# Patient Record
Sex: MALE | Race: WHITE | NOT HISPANIC OR LATINO | URBAN - METROPOLITAN AREA
[De-identification: names, ages, dates, MRNs, and addresses within clinical notes are randomized per-mention and may not be internally consistent; named-entity substitution may affect disease eponyms.]

---

## 2017-11-03 ENCOUNTER — IMPORTED ENCOUNTER (OUTPATIENT)
Dept: URBAN - METROPOLITAN AREA CLINIC 43 | Facility: CLINIC | Age: 47
End: 2017-11-03

## 2017-11-03 PROBLEM — H40.013: Noted: 2017-11-03

## 2017-11-03 PROBLEM — H16.223: Noted: 2017-11-03

## 2017-11-03 PROBLEM — H25.13: Noted: 2017-11-03

## 2017-11-03 PROBLEM — H35.373: Noted: 2017-11-03

## 2017-12-01 ENCOUNTER — IMPORTED ENCOUNTER (OUTPATIENT)
Dept: URBAN - METROPOLITAN AREA CLINIC 43 | Facility: CLINIC | Age: 47
End: 2017-12-01

## 2017-12-01 PROBLEM — T44.3X5A: Noted: 2017-12-01

## 2018-10-17 NOTE — PATIENT DISCUSSION
**MILD DRY EYE, OU: PRESCRIBED ARTIFICIAL TEARS 2-3 X A DAY OU. RECOMMENDS OMEGA-3 FISH OIL WITH [facility]IMARY [facility]ARE PHYSICIANS APPROVAL. RETURN FOR FOLLOW-UP AS SCHEDULED OR SOONER IF SYMPTOMS WORSEN.

## 2018-10-17 NOTE — PATIENT DISCUSSION
Continue: PreserVision AREDS 2 (vit c,h-de-hmzvu-lutein-zeaxan): capsule: 028-168-68-8 mg-unit-mg-mg 1 capsule once a day by mouth

## 2018-10-17 NOTE — PATIENT DISCUSSION
POSTERIOR CAPSULAR FIBROSIS, OU:   VISUALLY SIGNIFICANT. SCHEDULE YAG CAPSULOTOMY OS FIRST THEN LATER YAG CAPSULOTOMY OD IF VISUAL SYMPTOMS PERSIST.

## 2018-12-31 NOTE — PATIENT DISCUSSION
Continue: PreserVision AREDS 2 (vit c,u-in-ddffl-lutein-zeaxan): capsule: 827-790-91-7 mg-unit-mg-mg 1 capsule once a day by mouth

## 2019-11-14 NOTE — PATIENT DISCUSSION
AMD (DRY), OU:  PRESCRIBE AREDS 2 VITAMINS AND RECOMMEND UV PROTECTION. PATIENT IS AWARE OF AMSLER GRID AND HOW TO CHECK FOR PROGRESSION. SMOKING AVOIDANCE ADVISED. REFER TO DR. Fernanda Viveros FOR EVALUATION.

## 2020-04-10 NOTE — PATIENT DISCUSSION
Continue: PreserVision AREDS 2 (vit c,h-bw-yczsq-lutein-zeaxan): capsule: [phone] mg-unit-mg-mg 1 capsule once a day by mouth

## 2020-04-19 ASSESSMENT — VISUAL ACUITY
OS_SC: J1+-1
OS_SC: 20/20
OS_SC: 20/15-2
OD_SC: 20/25-1
OS_CC: J1+
OS_SC: 20/20 -1
OD_CC: J1+
OD_SC: J1+-1
OD_SC: 20/25+2
OD_SC: 20/20

## 2020-04-19 ASSESSMENT — KERATOMETRY
OS_K2POWER_DIOPTERS: 42.75
OD_AXISANGLE2_DEGREES: 47
OS_K1POWER_DIOPTERS: 42.5
OD_AXISANGLE_DEGREES: 137
OD_K1POWER_DIOPTERS: 43
OS_AXISANGLE2_DEGREES: 101
OS_AXISANGLE_DEGREES: 11
OD_K2POWER_DIOPTERS: 43.5

## 2020-04-19 ASSESSMENT — TONOMETRY
OD_IOP_MMHG: 15.0
OS_IOP_MMHG: 11.0
OS_IOP_MMHG: 13.0
OD_IOP_MMHG: 12.0

## 2020-06-05 NOTE — PATIENT DISCUSSION
Continue: PreserVision AREDS 2 (vit c,c-vz-scxvn-lutein-zeaxan): capsule: 070-439-90-9 mg-unit-mg-mg 1 capsule once a day by mouth

## 2020-10-16 NOTE — PATIENT DISCUSSION
Continue: PreserVision AREDS 2 (vit c,z-ms-sltnc-lutein-zeaxan): capsule: 815-033-61-1 mg-unit-mg-mg 1 capsule once a day by mouth

## 2022-12-01 NOTE — PATIENT DISCUSSION
"----- Message from Pablito Eastman sent at 12/1/2022  9:57 AM CST -----  Regarding: Appt  Contact: URBANO RICO [[id number]]  ===View-only below this line===      ----- Message -----       From:Urbano Rico "Juan F"       Sent:11/30/2022  4:59 PM CST         To:Bienvenido Aldana MD    Subject:Appointment Request    Appointment Request From: Urbano Rico    With Provider: Bienvenido Aldana MD [[facility]]    Preferred Date Range: Any    Preferred Times: Monday Afternoon, Tuesday Afternoon, Wednesday Afternoon, Thursday Afternoon, Friday Afternoon    Reason for visit: I might have Pink Eye    Comments:  I think I might have pink eye.     " STABLE NON-EXUDATIVE AMD, OU:  CONTINUE AREDS 2 VITAMINS / AMSLER GRID QD/ UV PROTECTION. SMOKING AVOIDANCE REVIEWED. RETURN FOR FOLLOW-UP AS SCHEDULED.